# Patient Record
Sex: FEMALE | Race: WHITE | Employment: FULL TIME | URBAN - METROPOLITAN AREA
[De-identification: names, ages, dates, MRNs, and addresses within clinical notes are randomized per-mention and may not be internally consistent; named-entity substitution may affect disease eponyms.]

---

## 2018-03-12 ENCOUNTER — HOSPITAL ENCOUNTER (OUTPATIENT)
Dept: GENERAL RADIOLOGY | Age: 53
Discharge: HOME | End: 2018-03-12
Attending: ORTHOPAEDIC SURGERY
Payer: COMMERCIAL

## 2018-03-12 ENCOUNTER — HOSPITAL ENCOUNTER (OUTPATIENT)
Dept: MRI IMAGING | Age: 53
Discharge: HOME | End: 2018-03-12
Attending: ORTHOPAEDIC SURGERY
Payer: COMMERCIAL

## 2018-03-12 DIAGNOSIS — M17.12 OSTEOARTHRITIS OF LEFT KNEE: ICD-10-CM

## 2018-03-12 PROCEDURE — 73600 X-RAY EXAM OF ANKLE: CPT

## 2018-03-12 PROCEDURE — 73721 MRI JNT OF LWR EXTRE W/O DYE: CPT

## 2018-03-12 PROCEDURE — 73560 X-RAY EXAM OF KNEE 1 OR 2: CPT

## 2018-03-12 PROCEDURE — 73501 X-RAY EXAM HIP UNI 1 VIEW: CPT

## 2018-04-26 ENCOUNTER — HOSPITAL ENCOUNTER (OUTPATIENT)
Dept: PREADMISSION TESTING | Age: 53
Discharge: HOME | End: 2018-04-26
Payer: COMMERCIAL

## 2018-04-26 VITALS — HEIGHT: 70 IN | WEIGHT: 180 LBS | BODY MASS INDEX: 25.77 KG/M2

## 2018-04-26 LAB
ALBUMIN SERPL-MCNC: 4 G/DL (ref 3.4–5)
ALBUMIN/GLOB SERPL: 1.3 {RATIO} (ref 0.8–1.7)
ALP SERPL-CCNC: 51 U/L (ref 45–117)
ALT SERPL-CCNC: 19 U/L (ref 13–56)
ANION GAP SERPL CALC-SCNC: 12 MMOL/L (ref 3–18)
APPEARANCE UR: CLEAR
AST SERPL-CCNC: 17 U/L (ref 15–37)
ATRIAL RATE: 69 BPM
BACTERIA SPEC CULT: NORMAL
BILIRUB SERPL-MCNC: 0.5 MG/DL (ref 0.2–1)
BILIRUB UR QL: NEGATIVE
BUN SERPL-MCNC: 16 MG/DL (ref 7–18)
BUN/CREAT SERPL: 22 (ref 12–20)
CALCIUM SERPL-MCNC: 9.1 MG/DL (ref 8.5–10.1)
CALCULATED P AXIS, ECG09: 26 DEGREES
CALCULATED R AXIS, ECG10: 36 DEGREES
CALCULATED T AXIS, ECG11: 41 DEGREES
CHLORIDE SERPL-SCNC: 102 MMOL/L (ref 100–108)
CO2 SERPL-SCNC: 25 MMOL/L (ref 21–32)
COLOR UR: YELLOW
CREAT SERPL-MCNC: 0.73 MG/DL (ref 0.6–1.3)
DIAGNOSIS, 93000: NORMAL
ERYTHROCYTE [DISTWIDTH] IN BLOOD BY AUTOMATED COUNT: 15.3 % (ref 11.6–14.5)
GLOBULIN SER CALC-MCNC: 3.1 G/DL (ref 2–4)
GLUCOSE SERPL-MCNC: 74 MG/DL (ref 74–99)
GLUCOSE UR STRIP.AUTO-MCNC: NEGATIVE MG/DL
HCT VFR BLD AUTO: 37.7 % (ref 35–45)
HGB BLD-MCNC: 11.7 G/DL (ref 12–16)
HGB UR QL STRIP: NEGATIVE
KETONES UR QL STRIP.AUTO: NEGATIVE MG/DL
LEUKOCYTE ESTERASE UR QL STRIP.AUTO: NEGATIVE
MCH RBC QN AUTO: 27.3 PG (ref 24–34)
MCHC RBC AUTO-ENTMCNC: 31 G/DL (ref 31–37)
MCV RBC AUTO: 88.1 FL (ref 74–97)
NITRITE UR QL STRIP.AUTO: NEGATIVE
P-R INTERVAL, ECG05: 160 MS
PH UR STRIP: 7.5 [PH] (ref 5–8)
PLATELET # BLD AUTO: 318 K/UL (ref 135–420)
PMV BLD AUTO: 10.3 FL (ref 9.2–11.8)
POTASSIUM SERPL-SCNC: 4.5 MMOL/L (ref 3.5–5.5)
PROT SERPL-MCNC: 7.1 G/DL (ref 6.4–8.2)
PROT UR STRIP-MCNC: NEGATIVE MG/DL
Q-T INTERVAL, ECG07: 394 MS
QRS DURATION, ECG06: 104 MS
QTC CALCULATION (BEZET), ECG08: 422 MS
RBC # BLD AUTO: 4.28 M/UL (ref 4.2–5.3)
SERVICE CMNT-IMP: NORMAL
SODIUM SERPL-SCNC: 139 MMOL/L (ref 136–145)
SP GR UR REFRACTOMETRY: 1.01 (ref 1–1.03)
UROBILINOGEN UR QL STRIP.AUTO: 0.2 EU/DL (ref 0.2–1)
VENTRICULAR RATE, ECG03: 69 BPM
WBC # BLD AUTO: 4.1 K/UL (ref 4.6–13.2)

## 2018-04-26 PROCEDURE — 81003 URINALYSIS AUTO W/O SCOPE: CPT | Performed by: ORTHOPAEDIC SURGERY

## 2018-04-26 PROCEDURE — 87641 MR-STAPH DNA AMP PROBE: CPT | Performed by: ORTHOPAEDIC SURGERY

## 2018-04-26 PROCEDURE — 93005 ELECTROCARDIOGRAM TRACING: CPT

## 2018-04-26 PROCEDURE — 87086 URINE CULTURE/COLONY COUNT: CPT | Performed by: ORTHOPAEDIC SURGERY

## 2018-04-26 PROCEDURE — 85027 COMPLETE CBC AUTOMATED: CPT | Performed by: ORTHOPAEDIC SURGERY

## 2018-04-26 PROCEDURE — 80053 COMPREHEN METABOLIC PANEL: CPT | Performed by: ORTHOPAEDIC SURGERY

## 2018-04-26 RX ORDER — IBUPROFEN 200 MG
200 TABLET ORAL
COMMUNITY
Start: 2011-10-10 | End: 2018-05-16

## 2018-04-26 RX ORDER — METRONIDAZOLE 10 MG/G
GEL TOPICAL
COMMUNITY

## 2018-04-26 RX ORDER — CLINDAMYCIN HYDROCHLORIDE 300 MG/1
300 CAPSULE ORAL
COMMUNITY
End: 2018-05-16

## 2018-04-26 RX ORDER — CEFAZOLIN SODIUM/WATER 2 G/20 ML
2 SYRINGE (ML) INTRAVENOUS ONCE
Status: CANCELLED | OUTPATIENT
Start: 2018-04-26 | End: 2018-04-26

## 2018-04-26 RX ORDER — LEVOTHYROXINE SODIUM 50 UG/1
1 TABLET ORAL
COMMUNITY
Start: 2011-10-10

## 2018-04-26 RX ORDER — SODIUM CHLORIDE, SODIUM LACTATE, POTASSIUM CHLORIDE, CALCIUM CHLORIDE 600; 310; 30; 20 MG/100ML; MG/100ML; MG/100ML; MG/100ML
125 INJECTION, SOLUTION INTRAVENOUS CONTINUOUS
Status: CANCELLED | OUTPATIENT
Start: 2018-04-26

## 2018-04-26 NOTE — PERIOP NOTES
Chg wipes givenPatient states that the family physician is not aware of upcoming procedureDenies sleep apneaDenies family history of anesthesia complicationsDenies shortness of breath nor chest pain while climbing stairs

## 2018-04-28 LAB
BACTERIA SPEC CULT: NORMAL
SERVICE CMNT-IMP: NORMAL

## 2018-05-11 PROBLEM — M17.12 PRIMARY OSTEOARTHRITIS OF LEFT KNEE: Chronic | Status: ACTIVE | Noted: 2018-05-11

## 2018-05-11 PROBLEM — E03.9 ACQUIRED HYPOTHYROIDISM: Chronic | Status: ACTIVE | Noted: 2018-05-11

## 2018-05-11 PROBLEM — D64.9 ANEMIA: Chronic | Status: ACTIVE | Noted: 2018-05-11

## 2018-05-11 NOTE — H&P
History and Physical        Patient: Lupis Ng               Sex: female          DOA: (Not on file)         YOB: 1965      Age:  48 y.o.        LOS:  LOS: 0 days        HPI:     Dez Rosenberg is here for evaluation and treatment regarding her left knee severe lateral tibiofemoral and patellofemoral DJD with also medial joint line symptomatology. She has been through Euflexxa injections which did not help, cortisone injections, and she has tried Motrin. 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 It is really affecting her activities of daily living. She used to walk three miles a day and subsequently had to stop that. She has some question of giving way and crepitation laterally. She has episodes where she has significant decreased range of motion, even passively. She does feel instability. She is ready to consider total knee arthroplasty at this point. She is status post a right TKA done by Dr. Roberto Paul previously, and she has had great results with that. She has tried glucosamine chondroitin sulfate and this has not been markedly helpful either. Standing AP, tunnel, lateral, and sunrise views of the knee were obtained and interpreted in the office and reveal severe lateral tibiofemoral DJD and patellofemoral joint DJD with tilting as well. Otherwise, no periosteal reaction, no medullary lesions, no osteopenia, and no chondrolysis. Past Medical History:   Diagnosis Date    Arthritis     Rosacea     Thyroid disease     hypo       Past Surgical History:   Procedure Laterality Date    HX KNEE ARTHROSCOPY      left    HX KNEE REPLACEMENT      right       No family history on file.     Social History     Social History    Marital status: SINGLE     Spouse name: N/A    Number of children: N/A    Years of education: N/A     Social History Main Topics    Smoking status: Never Smoker    Smokeless tobacco: Never Used    Alcohol use Yes      Comment: 10 glasses per week    Drug use: No    Sexual activity: Not on file     Other Topics Concern    Not on file     Social History Narrative    No narrative on file       Prior to Admission medications    Medication Sig Start Date End Date Taking? Authorizing Provider   levothyroxine (SYNTHROID) 50 mcg tablet Take 1 Tab by mouth. 10/10/11   Historical Provider   metroNIDAZOLE (METROGEL) 1 % topical gel Apply  to affected area. Indications: ACNE ROSACEA    Historical Provider   ibuprofen (MOTRIN IB) 200 mg tablet 200 mg. 10/10/11   Historical Provider   clindamycin (CLEOCIN) 300 mg capsule Take 300 mg by mouth. Historical Provider       Allergies not on file    Review of Systems    Pertinent positives include joint pain and joint stiffness. Pertinent negatives include chills, fever, weight change and loss of balance. Physical Exam:      There were no vitals taken for this visit. Physical Exam:  On physical examination of the patient's left knee, she does have significant patellofemoral crepitation. She is in a valgus stance. She has tenderness to palpation over the lateral tibiofemoral joint and some tenderness to palpation of the medial tibiofemoral joint. Otherwise, examination of the left knee demonstrates the patient has full extension to flexion well beyond 90 degrees. The patient has a negative Lachman and has no varus or valgus instability at zero degrees or 30 degrees. There is a negative posterior sag. There is no knee effusion. There is no swelling, ecchymosis, or wounds. The patient has no popliteal pain. The patient has a negative patellar inhibition, a negative patellar grind, and a negative patellar apprehension test.  There is a negative Santy Maneuver. There is no pain at the inferior pole of the patella or the tibial tubercle. The patient has 5/5 muscle strength with good quadriceps tone.   The patient has good capillary refill with normal motor strength of the foot and ankle and normal light-touch sensation in the foot and lower leg. Assessment/Plan     Principal Problem:    Primary osteoarthritis of left knee (5/11/2018)    Active Problems:    Acquired hypothyroidism (5/11/2018)      Anemia (5/11/2018)      The patient was seen and examined by Dr. Shonna Silvestre and plan discussed with Dr. Shonna Silvestre. She is going to be scheduled for a left total knee arthroplasty. The risks associated with this procedure including blood clots, anesthetic reaction, infection, neurovascular compromise, myotendinous injury, need for future surgery, DVT, PE, bleeding, amongst others, were reviewed and all questions were answered. We will use aspirin 81 mg b.i.d. for DVT prevention. She tolerated Norco the best after her last surgery, so we have issued a prescription for Norco for postoperative pain and Keflex, as he plans to do her as an outpatient procedure. She does report that she has a documented history of a penicillin allergy, but she has not had it in years and is not quite sure what the reaction was. She has had Keflex in the past and has not had a problem. I have asked her to follow up with Dr. Shonna Silvestre two weeks after the surgery.

## 2018-05-15 ENCOUNTER — ANESTHESIA EVENT (OUTPATIENT)
Dept: SURGERY | Age: 53
End: 2018-05-15
Payer: COMMERCIAL

## 2018-05-15 RX ORDER — PREGABALIN 25 MG/1
25 CAPSULE ORAL ONCE
Status: CANCELLED | OUTPATIENT
Start: 2018-05-15 | End: 2018-05-15

## 2018-05-15 RX ORDER — SODIUM CHLORIDE 0.9 % (FLUSH) 0.9 %
5-10 SYRINGE (ML) INJECTION EVERY 8 HOURS
Status: CANCELLED | OUTPATIENT
Start: 2018-05-15

## 2018-05-15 RX ORDER — ACETAMINOPHEN 10 MG/ML
1000 INJECTION, SOLUTION INTRAVENOUS ONCE
Status: CANCELLED | OUTPATIENT
Start: 2018-05-15 | End: 2018-05-15

## 2018-05-15 RX ORDER — SODIUM CHLORIDE 0.9 % (FLUSH) 0.9 %
5-10 SYRINGE (ML) INJECTION AS NEEDED
Status: CANCELLED | OUTPATIENT
Start: 2018-05-15

## 2018-05-15 RX ORDER — DIPHENHYDRAMINE HCL 25 MG
25 CAPSULE ORAL
Status: CANCELLED | OUTPATIENT
Start: 2018-05-15

## 2018-05-16 ENCOUNTER — HOSPITAL ENCOUNTER (OUTPATIENT)
Age: 53
Setting detail: OUTPATIENT SURGERY
Discharge: HOME HEALTH | End: 2018-05-16
Attending: ORTHOPAEDIC SURGERY | Admitting: ORTHOPAEDIC SURGERY
Payer: COMMERCIAL

## 2018-05-16 ENCOUNTER — ANESTHESIA (OUTPATIENT)
Dept: SURGERY | Age: 53
End: 2018-05-16
Payer: COMMERCIAL

## 2018-05-16 VITALS
DIASTOLIC BLOOD PRESSURE: 74 MMHG | TEMPERATURE: 97.8 F | WEIGHT: 174.13 LBS | HEART RATE: 68 BPM | OXYGEN SATURATION: 98 % | SYSTOLIC BLOOD PRESSURE: 132 MMHG | HEIGHT: 70 IN | BODY MASS INDEX: 24.93 KG/M2 | RESPIRATION RATE: 15 BRPM

## 2018-05-16 DIAGNOSIS — M17.12 PRIMARY OSTEOARTHRITIS OF LEFT KNEE: Primary | Chronic | ICD-10-CM

## 2018-05-16 LAB
ABO + RH BLD: NORMAL
BLOOD GROUP ANTIBODIES SERPL: NORMAL
HCG SERPL QL: NEGATIVE
SPECIMEN EXP DATE BLD: NORMAL

## 2018-05-16 PROCEDURE — 74011000258 HC RX REV CODE- 258: Performed by: ORTHOPAEDIC SURGERY

## 2018-05-16 PROCEDURE — C1713 ANCHOR/SCREW BN/BN,TIS/BN: HCPCS | Performed by: ORTHOPAEDIC SURGERY

## 2018-05-16 PROCEDURE — 77030020269 HC MISC IMPL: Performed by: ORTHOPAEDIC SURGERY

## 2018-05-16 PROCEDURE — 77030018836 HC SOL IRR NACL ICUM -A: Performed by: ORTHOPAEDIC SURGERY

## 2018-05-16 PROCEDURE — 77030031139 HC SUT VCRL2 J&J -A: Performed by: ORTHOPAEDIC SURGERY

## 2018-05-16 PROCEDURE — 77030013708 HC HNDPC SUC IRR PULS STRY –B: Performed by: ORTHOPAEDIC SURGERY

## 2018-05-16 PROCEDURE — 74011250637 HC RX REV CODE- 250/637: Performed by: PHYSICIAN ASSISTANT

## 2018-05-16 PROCEDURE — 76060000036 HC ANESTHESIA 2.5 TO 3 HR: Performed by: ORTHOPAEDIC SURGERY

## 2018-05-16 PROCEDURE — 77030002934 HC SUT MCRYL J&J -B: Performed by: ORTHOPAEDIC SURGERY

## 2018-05-16 PROCEDURE — 74011000250 HC RX REV CODE- 250: Performed by: ORTHOPAEDIC SURGERY

## 2018-05-16 PROCEDURE — 74011250637 HC RX REV CODE- 250/637: Performed by: ANESTHESIOLOGY

## 2018-05-16 PROCEDURE — 77030038011: Performed by: ORTHOPAEDIC SURGERY

## 2018-05-16 PROCEDURE — 77030037875 HC DRSG MEPILEX <16IN BORD MOLN -A: Performed by: ORTHOPAEDIC SURGERY

## 2018-05-16 PROCEDURE — 74011250636 HC RX REV CODE- 250/636: Performed by: ORTHOPAEDIC SURGERY

## 2018-05-16 PROCEDURE — 77030020782 HC GWN BAIR PAWS FLX 3M -B: Performed by: ORTHOPAEDIC SURGERY

## 2018-05-16 PROCEDURE — 36415 COLL VENOUS BLD VENIPUNCTURE: CPT | Performed by: ORTHOPAEDIC SURGERY

## 2018-05-16 PROCEDURE — 77030012508 HC MSK AIRWY LMA AMBU -A: Performed by: ANESTHESIOLOGY

## 2018-05-16 PROCEDURE — 74011250636 HC RX REV CODE- 250/636

## 2018-05-16 PROCEDURE — 74011250637 HC RX REV CODE- 250/637: Performed by: ORTHOPAEDIC SURGERY

## 2018-05-16 PROCEDURE — 97161 PT EVAL LOW COMPLEX 20 MIN: CPT

## 2018-05-16 PROCEDURE — 64447 NJX AA&/STRD FEMORAL NRV IMG: CPT | Performed by: ANESTHESIOLOGY

## 2018-05-16 PROCEDURE — L1830 KO IMMOB CANVAS LONG PRE OTS: HCPCS | Performed by: ORTHOPAEDIC SURGERY

## 2018-05-16 PROCEDURE — 77030037400 HC ADH TISS HI VISC EXOFIN CHMP -B: Performed by: ORTHOPAEDIC SURGERY

## 2018-05-16 PROCEDURE — 77030018822 HC SLV COMPR FT COVD -B: Performed by: ORTHOPAEDIC SURGERY

## 2018-05-16 PROCEDURE — 74011250636 HC RX REV CODE- 250/636: Performed by: PHYSICIAN ASSISTANT

## 2018-05-16 PROCEDURE — 86901 BLOOD TYPING SEROLOGIC RH(D): CPT | Performed by: ORTHOPAEDIC SURGERY

## 2018-05-16 PROCEDURE — 76210000026 HC REC RM PH II 1 TO 1.5 HR: Performed by: ORTHOPAEDIC SURGERY

## 2018-05-16 PROCEDURE — 77030020268 HC MISC GENERAL SUPPLY: Performed by: ORTHOPAEDIC SURGERY

## 2018-05-16 PROCEDURE — 77030011640 HC PAD GRND REM COVD -A: Performed by: ORTHOPAEDIC SURGERY

## 2018-05-16 PROCEDURE — 74011250636 HC RX REV CODE- 250/636: Performed by: ANESTHESIOLOGY

## 2018-05-16 PROCEDURE — 74011000250 HC RX REV CODE- 250

## 2018-05-16 PROCEDURE — 77030027138 HC INCENT SPIROMETER -A: Performed by: ORTHOPAEDIC SURGERY

## 2018-05-16 PROCEDURE — 76942 ECHO GUIDE FOR BIOPSY: CPT | Performed by: ORTHOPAEDIC SURGERY

## 2018-05-16 PROCEDURE — 76010000132 HC OR TIME 2.5 TO 3 HR: Performed by: ORTHOPAEDIC SURGERY

## 2018-05-16 PROCEDURE — 84703 CHORIONIC GONADOTROPIN ASSAY: CPT | Performed by: ORTHOPAEDIC SURGERY

## 2018-05-16 PROCEDURE — 77030003666 HC NDL SPINAL BD -A: Performed by: ORTHOPAEDIC SURGERY

## 2018-05-16 PROCEDURE — 77030034694 HC SCPL CANADY PLSM DISP USMD -E: Performed by: ORTHOPAEDIC SURGERY

## 2018-05-16 PROCEDURE — 77030016060 HC NDL NRV BLK TELE -A: Performed by: ANESTHESIOLOGY

## 2018-05-16 PROCEDURE — 76210000006 HC OR PH I REC 0.5 TO 1 HR: Performed by: ORTHOPAEDIC SURGERY

## 2018-05-16 PROCEDURE — 77030038010: Performed by: ORTHOPAEDIC SURGERY

## 2018-05-16 PROCEDURE — 77030036563 HC WRP CLD THER KNE S2SG -B: Performed by: ORTHOPAEDIC SURGERY

## 2018-05-16 DEVICE — CEMENT BNE 40GM FULL DOSE PMMA W/O ANTIBIO HI VISC N RADPQ: Type: IMPLANTABLE DEVICE | Site: KNEE | Status: FUNCTIONAL

## 2018-05-16 RX ORDER — TRANEXAMIC ACID 650 1/1
1950 TABLET ORAL ONCE
Status: COMPLETED | OUTPATIENT
Start: 2018-05-16 | End: 2018-05-16

## 2018-05-16 RX ORDER — MIDAZOLAM HYDROCHLORIDE 1 MG/ML
INJECTION, SOLUTION INTRAMUSCULAR; INTRAVENOUS AS NEEDED
Status: DISCONTINUED | OUTPATIENT
Start: 2018-05-16 | End: 2018-05-16 | Stop reason: HOSPADM

## 2018-05-16 RX ORDER — GUAIFENESIN 100 MG/5ML
81 LIQUID (ML) ORAL 2 TIMES DAILY
Qty: 42 TAB | Refills: 0 | Status: SHIPPED | OUTPATIENT
Start: 2018-05-16

## 2018-05-16 RX ORDER — ACETAMINOPHEN 500 MG
1000 TABLET ORAL ONCE
Status: COMPLETED | OUTPATIENT
Start: 2018-05-16 | End: 2018-05-16

## 2018-05-16 RX ORDER — FENTANYL CITRATE 50 UG/ML
50 INJECTION, SOLUTION INTRAMUSCULAR; INTRAVENOUS
Status: DISCONTINUED | OUTPATIENT
Start: 2018-05-16 | End: 2018-05-16 | Stop reason: HOSPADM

## 2018-05-16 RX ORDER — LIDOCAINE HYDROCHLORIDE 20 MG/ML
INJECTION, SOLUTION EPIDURAL; INFILTRATION; INTRACAUDAL; PERINEURAL AS NEEDED
Status: DISCONTINUED | OUTPATIENT
Start: 2018-05-16 | End: 2018-05-16 | Stop reason: HOSPADM

## 2018-05-16 RX ORDER — HYDROCODONE BITARTRATE AND ACETAMINOPHEN 5; 325 MG/1; MG/1
1-2 TABLET ORAL
Qty: 60 TAB | Refills: 0 | Status: SHIPPED | OUTPATIENT
Start: 2018-05-16

## 2018-05-16 RX ORDER — CELECOXIB 100 MG/1
400 CAPSULE ORAL
Status: COMPLETED | OUTPATIENT
Start: 2018-05-16 | End: 2018-05-16

## 2018-05-16 RX ORDER — FENTANYL CITRATE 50 UG/ML
25 INJECTION, SOLUTION INTRAMUSCULAR; INTRAVENOUS
Status: DISCONTINUED | OUTPATIENT
Start: 2018-05-16 | End: 2018-05-16 | Stop reason: HOSPADM

## 2018-05-16 RX ORDER — PROPOFOL 10 MG/ML
INJECTION, EMULSION INTRAVENOUS AS NEEDED
Status: DISCONTINUED | OUTPATIENT
Start: 2018-05-16 | End: 2018-05-16 | Stop reason: HOSPADM

## 2018-05-16 RX ORDER — OXYCODONE HYDROCHLORIDE 5 MG/1
5 TABLET ORAL
Status: COMPLETED | OUTPATIENT
Start: 2018-05-16 | End: 2018-05-16

## 2018-05-16 RX ORDER — HYDROMORPHONE HYDROCHLORIDE 2 MG/ML
INJECTION, SOLUTION INTRAMUSCULAR; INTRAVENOUS; SUBCUTANEOUS AS NEEDED
Status: DISCONTINUED | OUTPATIENT
Start: 2018-05-16 | End: 2018-05-16 | Stop reason: HOSPADM

## 2018-05-16 RX ORDER — KETAMINE HYDROCHLORIDE 10 MG/ML
INJECTION, SOLUTION INTRAMUSCULAR; INTRAVENOUS AS NEEDED
Status: DISCONTINUED | OUTPATIENT
Start: 2018-05-16 | End: 2018-05-16 | Stop reason: HOSPADM

## 2018-05-16 RX ORDER — GLYCOPYRROLATE 0.2 MG/ML
INJECTION INTRAMUSCULAR; INTRAVENOUS AS NEEDED
Status: DISCONTINUED | OUTPATIENT
Start: 2018-05-16 | End: 2018-05-16 | Stop reason: HOSPADM

## 2018-05-16 RX ORDER — SODIUM CHLORIDE, SODIUM LACTATE, POTASSIUM CHLORIDE, CALCIUM CHLORIDE 600; 310; 30; 20 MG/100ML; MG/100ML; MG/100ML; MG/100ML
125 INJECTION, SOLUTION INTRAVENOUS CONTINUOUS
Status: DISCONTINUED | OUTPATIENT
Start: 2018-05-16 | End: 2018-05-16 | Stop reason: HOSPADM

## 2018-05-16 RX ORDER — SODIUM CHLORIDE 0.9 % (FLUSH) 0.9 %
5-10 SYRINGE (ML) INJECTION AS NEEDED
Status: DISCONTINUED | OUTPATIENT
Start: 2018-05-16 | End: 2018-05-16 | Stop reason: HOSPADM

## 2018-05-16 RX ORDER — ONDANSETRON 2 MG/ML
INJECTION INTRAMUSCULAR; INTRAVENOUS AS NEEDED
Status: DISCONTINUED | OUTPATIENT
Start: 2018-05-16 | End: 2018-05-16 | Stop reason: HOSPADM

## 2018-05-16 RX ORDER — CEFAZOLIN SODIUM/WATER 2 G/20 ML
2 SYRINGE (ML) INTRAVENOUS ONCE
Status: COMPLETED | OUTPATIENT
Start: 2018-05-16 | End: 2018-05-16

## 2018-05-16 RX ORDER — CEPHALEXIN 500 MG/1
500 CAPSULE ORAL 4 TIMES DAILY
Qty: 4 CAP | Refills: 0 | Status: SHIPPED | OUTPATIENT
Start: 2018-05-16 | End: 2018-05-17

## 2018-05-16 RX ORDER — DEXAMETHASONE SODIUM PHOSPHATE 4 MG/ML
INJECTION, SOLUTION INTRA-ARTICULAR; INTRALESIONAL; INTRAMUSCULAR; INTRAVENOUS; SOFT TISSUE AS NEEDED
Status: DISCONTINUED | OUTPATIENT
Start: 2018-05-16 | End: 2018-05-16 | Stop reason: HOSPADM

## 2018-05-16 RX ORDER — DEXMEDETOMIDINE HYDROCHLORIDE 4 UG/ML
INJECTION, SOLUTION INTRAVENOUS AS NEEDED
Status: DISCONTINUED | OUTPATIENT
Start: 2018-05-16 | End: 2018-05-16 | Stop reason: HOSPADM

## 2018-05-16 RX ORDER — SODIUM CHLORIDE, SODIUM LACTATE, POTASSIUM CHLORIDE, CALCIUM CHLORIDE 600; 310; 30; 20 MG/100ML; MG/100ML; MG/100ML; MG/100ML
100 INJECTION, SOLUTION INTRAVENOUS CONTINUOUS
Status: DISCONTINUED | OUTPATIENT
Start: 2018-05-16 | End: 2018-05-16 | Stop reason: HOSPADM

## 2018-05-16 RX ADMIN — KETAMINE HYDROCHLORIDE 20 MG: 10 INJECTION, SOLUTION INTRAMUSCULAR; INTRAVENOUS at 14:48

## 2018-05-16 RX ADMIN — DEXMEDETOMIDINE HYDROCHLORIDE 8 MCG: 4 INJECTION, SOLUTION INTRAVENOUS at 13:16

## 2018-05-16 RX ADMIN — FENTANYL CITRATE 50 MCG: 50 INJECTION, SOLUTION INTRAMUSCULAR; INTRAVENOUS at 16:01

## 2018-05-16 RX ADMIN — SODIUM CHLORIDE, SODIUM LACTATE, POTASSIUM CHLORIDE, AND CALCIUM CHLORIDE 1000 ML: 600; 310; 30; 20 INJECTION, SOLUTION INTRAVENOUS at 11:04

## 2018-05-16 RX ADMIN — PROPOFOL 200 MG: 10 INJECTION, EMULSION INTRAVENOUS at 13:05

## 2018-05-16 RX ADMIN — MIDAZOLAM HYDROCHLORIDE 2 MG: 1 INJECTION, SOLUTION INTRAMUSCULAR; INTRAVENOUS at 13:01

## 2018-05-16 RX ADMIN — ACETAMINOPHEN 1000 MG: 500 TABLET, FILM COATED ORAL at 12:17

## 2018-05-16 RX ADMIN — FENTANYL CITRATE 50 MCG: 50 INJECTION, SOLUTION INTRAMUSCULAR; INTRAVENOUS at 15:47

## 2018-05-16 RX ADMIN — DEXMEDETOMIDINE HYDROCHLORIDE 8 MCG: 4 INJECTION, SOLUTION INTRAVENOUS at 13:23

## 2018-05-16 RX ADMIN — GLYCOPYRROLATE 0.2 MG: 0.2 INJECTION INTRAMUSCULAR; INTRAVENOUS at 13:01

## 2018-05-16 RX ADMIN — TRANEXAMIC ACID 1950 MG: 650 TABLET ORAL at 10:55

## 2018-05-16 RX ADMIN — LIDOCAINE HYDROCHLORIDE 60 MG: 20 INJECTION, SOLUTION EPIDURAL; INFILTRATION; INTRACAUDAL; PERINEURAL at 13:05

## 2018-05-16 RX ADMIN — ONDANSETRON 4 MG: 2 INJECTION INTRAMUSCULAR; INTRAVENOUS at 13:11

## 2018-05-16 RX ADMIN — OXYCODONE HYDROCHLORIDE 5 MG: 5 TABLET ORAL at 16:46

## 2018-05-16 RX ADMIN — SODIUM CHLORIDE, SODIUM LACTATE, POTASSIUM CHLORIDE, AND CALCIUM CHLORIDE: 600; 310; 30; 20 INJECTION, SOLUTION INTRAVENOUS at 15:14

## 2018-05-16 RX ADMIN — DEXAMETHASONE SODIUM PHOSPHATE 8 MG: 4 INJECTION, SOLUTION INTRA-ARTICULAR; INTRALESIONAL; INTRAMUSCULAR; INTRAVENOUS; SOFT TISSUE at 13:11

## 2018-05-16 RX ADMIN — CELECOXIB 400 MG: 100 CAPSULE ORAL at 12:17

## 2018-05-16 RX ADMIN — HYDROMORPHONE HYDROCHLORIDE 1 MG: 2 INJECTION, SOLUTION INTRAMUSCULAR; INTRAVENOUS; SUBCUTANEOUS at 13:50

## 2018-05-16 RX ADMIN — FENTANYL CITRATE 50 MCG: 50 INJECTION, SOLUTION INTRAMUSCULAR; INTRAVENOUS at 15:52

## 2018-05-16 RX ADMIN — SODIUM CHLORIDE, SODIUM LACTATE, POTASSIUM CHLORIDE, AND CALCIUM CHLORIDE 125 ML/HR: 600; 310; 30; 20 INJECTION, SOLUTION INTRAVENOUS at 11:04

## 2018-05-16 RX ADMIN — KETAMINE HYDROCHLORIDE 30 MG: 10 INJECTION, SOLUTION INTRAMUSCULAR; INTRAVENOUS at 13:12

## 2018-05-16 RX ADMIN — SODIUM CHLORIDE, SODIUM LACTATE, POTASSIUM CHLORIDE, AND CALCIUM CHLORIDE: 600; 310; 30; 20 INJECTION, SOLUTION INTRAVENOUS at 13:32

## 2018-05-16 RX ADMIN — DEXMEDETOMIDINE HYDROCHLORIDE 8 MCG: 4 INJECTION, SOLUTION INTRAVENOUS at 13:50

## 2018-05-16 RX ADMIN — Medication 2 G: at 13:11

## 2018-05-16 NOTE — ANESTHESIA POSTPROCEDURE EVALUATION
Post-Anesthesia Evaluation and Assessment    Cardiovascular Function/Vital Signs  Visit Vitals    /78    Pulse 85    Temp 37.3 °C (99.1 °F)    Resp 16    Ht 5' 10\" (1.778 m)    Wt 79 kg (174 lb 2 oz)    SpO2 100%    BMI 24.98 kg/m2       Patient is status post Procedure(s):  LEFT TOTAL KNEE ARTHROPLASTY  **SPEC POP**PATIENT REQUESTS DR. LEE FOR ANESTHESIA**. Nausea/Vomiting: Controlled. Postoperative hydration reviewed and adequate. Pain:  Pain Scale 1: Numeric (0 - 10) (05/16/18 1605)  Pain Intensity 1: 3 (05/16/18 1605)   Managed. Neurological Status:   Neuro (WDL): Within Defined Limits (05/16/18 1108)   At baseline. Mental Status and Level of Consciousness: Baseline and stable. Pulmonary Status:   O2 Device: Room air (05/16/18 1556)   Adequate oxygenation and airway patent. Complications related to anesthesia: None    Post-anesthesia assessment completed. No concerns. Patient has met all discharge requirements.     Signed By: Hattie Bermudez MD

## 2018-05-16 NOTE — ANESTHESIA PROCEDURE NOTES
Peripheral Block    Start time: 5/16/2018 12:26 PM  End time: 5/16/2018 12:32 PM  Performed by: Edilma Carlos  Authorized by: Edilma Carlos       Pre-procedure: Indications: at surgeon's request and post-op pain management    Preanesthetic Checklist: patient identified, risks and benefits discussed, site marked, timeout performed, anesthesia consent given and patient being monitored    Timeout Time: 12:26          Block Type:   Block Type:   Adductor canal  Monitoring:  Standard ASA monitoring, continuous pulse ox, frequent vital sign checks, heart rate, responsive to questions and oxygen  Injection Technique:  Single shot  Procedures: ultrasound guided    Patient Position: supine  Prep: chlorhexidine    Needle Type:  Stimuplex  Needle Gauge:  21 G  Needle Localization:  Anatomical landmarks, infiltration and ultrasound guidance  Medication Injected:  0.5%  ropivacaine  Volume (mL):  25    Assessment:  Number of attempts:  1  Injection Assessment:  Incremental injection every 5 mL, local visualized surrounding nerve on ultrasound, negative aspiration for blood, no paresthesia, no intravascular symptoms and ultrasound image on chart  Patient tolerance:  Patient tolerated the procedure well with no immediate complications

## 2018-05-16 NOTE — PROGRESS NOTES
Problem: Mobility Impaired (Adult and Pediatric)  Goal: *Acute Goals and Plan of Care (Insert Text)  Goals to be addressed in 1-4 days:  STG  1. Rolling L to R to L modified independent for positioning. 2. Supine to sit to supine S with HR for meals. 3. Sit to stand to sit S with RW in prep for ambulation. LTG  1. Ambulate >150ft S with RW, WBAT, for home/community mobility. 2. Ascend/descend a >3 stair steps CGA with HR for home entry. 3. Tolerate up in chair 1-2 hours for ADLs. 4. Patient/family to be independent with HEP for follow-up care and safe discharge. Outcome: Resolved/Met Date Met: 05/16/18  physical Therapy EVALUATION & Discharge    Patient: Zoila Mcburney (54 y.o. female)  Date: 5/16/2018  Primary Diagnosis: LEFT KNEE OSTEOARTHRITIS  Left knee DJD  Procedure(s) (LRB):  LEFT TOTAL KNEE ARTHROPLASTY  **SPEC POP**PATIENT REQUESTS DR. LEE FOR ANESTHESIA** (Left) Day of Surgery   Precautions:   Fall, WBAT    ASSESSMENT AND RECOMMENDATIONS:  Based on the objective data described below, the patient presents with lower extremity weakness, decreased gait quality and endurance, decreased R knee ROM/flexibility, and overall limitations in functional mobility s/p R TKR. Pt performed supine to sit with Erna, sit to stand with supervision. Patient ambulated 150 feet with RW, GB applied, supervision, negotiated 3 stairs with B handrails c/Supervision. Pt tolerated session well as evidenced by no c/o increased pain, no lightheadedness or dizziness. Pt has been educated on seated and supine therex to be performed 3x/day at home to improve strength and ROM. Pt has met inpatient physical therapy goals at this time and home health physical therapy is recommended upon discharge from hospital.    Discharge Recommendations: Home Health  Further Equipment Recommendations for Discharge: N/A      SUBJECTIVE:   Patient stated I am doing ok.     OBJECTIVE DATA SUMMARY:     Past Medical History:   Diagnosis Date    Arthritis     Rosacea     Thyroid disease     hypo     Past Surgical History:   Procedure Laterality Date    HX KNEE ARTHROSCOPY      left    HX KNEE REPLACEMENT      right     Barriers to Learning/Limitations: None  Compensate with: visual, verbal, tactile, kinesthetic cues/model  Prior Level of Function/Home Situation: Independent amb s/AD  Home Situation  Home Environment: Private residence  # Steps to Enter: 3  Rails to Enter: Yes  Hand Rails : Bilateral  One/Two Story Residence: One story  Living Alone: No  Support Systems: Spouse/Significant Other/Partner, Child(manju)  Patient Expects to be Discharged to[de-identified] Private residence  Current DME Used/Available at Home: Walker, rolling  Critical Behavior:  Neurologic State: Alert  Skin Condition/Temp: Warm;Dry  Skin Integrity: Incision (comment)  Skin Integumentary  Skin Color: Appropriate for ethnicity  Skin Condition/Temp: Warm;Dry  Skin Integrity: Incision (comment)  Strength:    Strength: Generally decreased, functional  Tone & Sensation:   Tone: Normal  Sensation: Intact  Range Of Motion:  AROM: Generally decreased, functional  PROM: Generally decreased, functional  Functional Mobility:  Bed Mobility:  Supine to Sit: Modified independent  Sit to Supine: Modified independent  Transfers:  Sit to Stand: Supervision  Stand to Sit: Supervision  Balance:   Sitting: Intact  Standing: Intact; With support  Ambulation/Gait Training:  Distance (ft): 150 Feet (ft)  Assistive Device: Gait belt;Walker, rolling  Ambulation - Level of Assistance: Supervision  Gait Description (WDL): Exceptions to WDL  Gait Abnormalities: Decreased step clearance; Antalgic; Step to gait  Left Side Weight Bearing: As tolerated  Base of Support: Shift to right  Stance: Left decreased  Speed/Sunitha: Slow  Step Length: Right shortened;Left shortened  Swing Pattern: Left asymmetrical;Right asymmetrical  Pain:  Pain Scale 1: Numeric (0 - 10)  Pain Intensity 1: 5  Pain Location 1: Knee  Pain Orientation 1: Left  Pain Description 1: Aching  Pain Intervention(s) 1: Medication (see MAR)  Activity Tolerance:   Good  Please refer to the flowsheet for vital signs taken during this treatment. After treatment:   []         Patient left in no apparent distress sitting up in chair  [x]         Patient left in no apparent distress in bed  [x]         Call bell left within reach  [x]         Nursing notified  []         Caregiver present  []         Bed alarm activated    COMMUNICATION/EDUCATION:   [x]         Fall prevention education was provided and the patient/caregiver indicated understanding. [x]         Patient/family have participated as able in goal setting and plan of care to d/c home.  []         Patient/family agree to work toward stated goals and plan of care. []         Patient understands intent and goals of therapy, but is neutral about his/her participation. []         Patient is unable to participate in goal setting and plan of care. Thank you for this referral.  Tara Odell   Time Calculation: 20 mins  Eval Complexity: History: MEDIUM  Complexity : 1-2 comorbidities / personal factors will impact the outcome/ POC Exam:LOW Complexity : 1-2 Standardized tests and measures addressing body structure, function, activity limitation and / or participation in recreation  Presentation: LOW Complexity : Stable, uncomplicated  Clinical Decision Making:Low Complexity amb >30 ft c/RW, supervision for safety with mobility Overall Complexity:LOW  Mobility  Current  CI= 1-19%   Goal  CI= 1-19%  D/C  CI= 1-19%. The severity rating is based on the Level of Assistance required for Functional Mobility and ADLs.

## 2018-05-16 NOTE — ANESTHESIA PREPROCEDURE EVALUATION
Anesthetic History        Pertinent negatives: No PONV       Review of Systems / Medical History  Patient summary reviewed, nursing notes reviewed and pertinent labs reviewed    Pulmonary              Pertinent negatives: No asthma, sleep apnea and smoker     Neuro/Psych           Pertinent negatives: No seizures and CVA   Cardiovascular                Pertinent negatives: No hypertension, past MI and angina  Exercise tolerance: >4 METS     GI/Hepatic/Renal             Pertinent negatives: No GERD, liver disease and renal disease   Endo/Other      Hypothyroidism: well controlled  Arthritis     Other Findings              Physical Exam    Airway  Mallampati: I  TM Distance: 4 - 6 cm  Neck ROM: normal range of motion   Mouth opening: Normal     Cardiovascular    Rhythm: regular  Rate: normal         Dental  No notable dental hx    Comments: Bonded retainer bottom   Pulmonary  Breath sounds clear to auscultation               Abdominal  GI exam deferred       Other Findings            Anesthetic Plan    ASA: 2  Anesthesia type: general      Post-op pain plan if not by surgeon: peripheral nerve block single    Induction: Intravenous  Anesthetic plan and risks discussed with: Patient      Plan GA/LMA with single shot adductor canal nerve block to aid in post-op pain control. Pt understands plan and risks and agrees to proceed.

## 2018-05-16 NOTE — DISCHARGE INSTRUCTIONS
Astrid Cano III, MD Rayleen Gambles, PA-C    Lower Extremity Surgery  Discharge Instructions      Please take the time to review the following instructions before you leave the hospital and use them as guidelines during your recovery from surgery. If you have any questions you may contact my office at [phone]. Wound Care/Dressing Changes:    [x]   You may change your dressing as needed. Beginning the 2 days after you are discharged from the hospital you can change your dressing daily. A big, bulky dressing isn't necessary as long as there isn't any drainage from the incisions. You will be shown how to change the dressings properly by the home health aids as well. There will be a piece of tape over your incision that resembles gauze. This tape should stay on and you should not attempt to remove it. Once you begin to get this wet in the shower this will begin to fall off on its own, although it will take days. Do not apply antibiotic ointment to your incisions. Showering/Bathing:    [x]   You may shower 2 days after surgery. Your dressing may be removed for showering. You may get your incisions wet in the shower. Don't vigorously scrub the area where your incisions are. Please pat dry the incision. Apply a clean, dry dressing after drying off the area of your incisions. Don't take a tub bath, get in a swimming pool or Jacuzzi until the incisions are completely healed, and you are seen in the office by Dr. Roberto Paul. Do not soak your incisions under water. []   Do not get the dressing wet. Once you remove it two days from surgery, you may get the incision wet if there is no drainage. Weight Bearing Status/Braces/Activity:    [x]   If you have had a total knee replacement you may weight bear as tolerated with the knee immobilizer in place. Use crutches, cane, or walker as needed. You should sleep in your knee immobilizer.   You need to begin working on range of motion early after your surgery. It is very important to work on extension (straighthening) the knee. You will be advanced with walking and range of motion by physical therapy at home.     []   If you have had a total hip replacement you may weight bear as tolerated. Physical therapy with come by your house to help you perform exercises and work on strength and range of motion. Ice/Elevation:    Continue ice and elevation consistently for 48 hours after surgery. If you have had a total knee replacement when elevating your knee elevate it on about 4 pillows to be sure it is above your heart. After 48 hours, you should ice your knee 3 times per day, for 20 minutes at a time for the next 5 days. After one week from surgery, you may use ice and elevation as needed for pain and swelling. Diet:    You may advance to your regular diet as tolerated. Medication:    1. You will be given a prescription for pain medications when you are discharged from the hospital.  Take the medication as needed according to the directions on the prescription bottle. Possible side effects of the medication include dizziness, headache, nausea, vomiting, constipation and urinary retention. If you experience any of these side effects call the office so that we can assist you in relieving them. Discontinue the use of the pain medication if you develop itching, rash, shortness of breath or difficulties swallowing. If these symptoms become severe or aren't relieved by discontinuing the medication you should seek immediate medical attention. Refills of pain medication are authorized during office hours only (8AM - 5PM Mon thru Fri). 2. If you were prescribed Percocet/oxycodone or Dilaudid/hydromorphone you must have a written prescription. These medications legally cannot be called in to a pharmacy. 3. Do not take Tylenol in addition to your pain medication as most of the pain medication already contains Tylenol.   Exceptions include Dilaudid/hydromorphone, Demerol/meperidine and roxicodone. Do not exceed 3000 mg of Tylenol per day. Ex:  (hydrocodone 5/325mg = 325 mg of Tylenol)  4. You should use Aspirin 81 mg twice daily for 21 days from the date of your surgery. This will help to prevent blood clots from forming in your legs. This needs to be started the day after your surgery and home healthcare will teach you how this is done. If you are taking another medication such as Xarelto as discussed with Dr. Ana Urbina this should also be started the day after the surgery. 5. You may resume the medications you were taking prior to your surgery, unless otherwise specified in your discharge instructions. Pain medication may change the effects of any antidepressant medication. If you have any questions about possible interactions between your regular medications and the pain medication you should consult the physician who prescribes your regular medications. 6. If you had a total joint as an outpatient procedure and did not spend the night in the hospital, Dr. Ana Urbina has written for an oral antibiotic that you should take as instructed. This antibiotic is generally Keflex or Clindamycin. If you spent the night in the hospital the antibiotic was given to you through the IV and there is nothing else to take orally. Follow Up Appointment:    If you are unsure of your follow-up appointment date and time, please call [phone]. Please let our  know you are scheduling a post-op appointment. Most appointments should be between 7-14 days after your surgery. Physical Therapy:    [x]   Physical therapy will be discussed with you at your first follow-up appointment with Dr. Ana Urbina. You don't need to begin physical therapy prior to that visit. You are to participate with [address] [place] [address] as arranged pre-operatively in the convience of your own home.     Important signs and symptoms:    If any of the following signs and symptoms occurs, you should contact Dr. Elliot Winston' office. Please be advised if a problem arises which you feel required immediate medical attention or your are unable to contact Dr. Elliot Winston' office you should seek immediate medical attention. Signs and symptoms to watch for include:  1. A sudden increase in swelling and/or redness or warmth at the area your surgery was performed which isn't relieved by rest, ice and elevation. 2. Oral temperature greater than 101.5 degrees for 12 hours or more which isn't relieved by an increase in fluid intake and taking two Tylenol every 4-6 hours. Do not exceed 3000 mg of Tylenol per day. 3. Excessive drainage from your incisions or drainage that hasn't stopped by 72 hours. 4. Calf pian, tenderness, redness or swelling which isn't relieved with rest and elevation. 5. Fever, chills, shortness of breath, chest pain, nausea, vomiting or other signs and symptoms which are of concern to you. DISCHARGE SUMMARY from Nurse    PATIENT INSTRUCTIONS:    After general anesthesia or intravenous sedation, for 24 hours or while taking prescription Narcotics:  · Limit your activities  · Do not drive and operate hazardous machinery  · Do not make important personal or business decisions  · Do  not drink alcoholic beverages  · If you have not urinated within 8 hours after discharge, please contact your surgeon on call. Report the following to your surgeon:  · Excessive pain, swelling, redness or odor of or around the surgical area  · Temperature over 100.5  · Nausea and vomiting lasting longer than 4 hours or if unable to take medications  · Any signs of decreased circulation or nerve impairment to extremity: change in color, persistent  numbness, tingling, coldness or increase pain  · Any questions    What to do at Home:  Recommended activity: Ambulate in house,     If you experience any of the following symptoms above, please follow up with Dr. Elliot Winston.     *  Please give a list of your current medications to your Primary Care Provider. *  Please update this list whenever your medications are discontinued, doses are      changed, or new medications (including over-the-counter products) are added. *  Please carry medication information at all times in case of emergency situations. These are general instructions for a healthy lifestyle:    No smoking/ No tobacco products/ Avoid exposure to second hand smoke  Surgeon General's Warning:  Quitting smoking now greatly reduces serious risk to your health. Obesity, smoking, and sedentary lifestyle greatly increases your risk for illness    A healthy diet, regular physical exercise & weight monitoring are important for maintaining a healthy lifestyle    You may be retaining fluid if you have a history of heart failure or if you experience any of the following symptoms:  Weight gain of 3 pounds or more overnight or 5 pounds in a week, increased swelling in our hands or feet or shortness of breath while lying flat in bed. Please call your doctor as soon as you notice any of these symptoms; do not wait until your next office visit. Recognize signs and symptoms of STROKE:    F-face looks uneven    A-arms unable to move or move unevenly    S-speech slurred or non-existent    T-time-call 911 as soon as signs and symptoms begin-DO NOT go       Back to bed or wait to see if you get better-TIME IS BRAIN. Warning Signs of HEART ATTACK     Call 911 if you have these symptoms:   Chest discomfort. Most heart attacks involve discomfort in the center of the chest that lasts more than a few minutes, or that goes away and comes back. It can feel like uncomfortable pressure, squeezing, fullness, or pain.  Discomfort in other areas of the upper body. Symptoms can include pain or discomfort in one or both arms, the back, neck, jaw, or stomach.  Shortness of breath with or without chest discomfort.    Other signs may include breaking out in a cold sweat, nausea, or lightheadedness. Don't wait more than five minutes to call 911 - MINUTES MATTER! Fast action can save your life. Calling 911 is almost always the fastest way to get lifesaving treatment. Emergency Medical Services staff can begin treatment when they arrive -- up to an hour sooner than if someone gets to the hospital by car. Patient armband removed and shredded     The discharge information has been reviewed with the patient and caregiver. The patient and caregiver verbalized understanding. Discharge medications reviewed with the patient and caregiver and appropriate educational materials and side effects teaching were provided.   ___________________________________________________________________________________________________________________________________

## 2018-05-16 NOTE — INTERVAL H&P NOTE
H&P Update:  Jade Mccain was seen and examined. History and physical has been reviewed. The patient has been examined. There have been no significant clinical changes since the completion of the originally dated History and Physical.  Patient identified by surgeon; surgical site was confirmed by patient and surgeon.     Signed By: Keny Greco MD     May 16, 2018 11:59 AM

## 2018-05-16 NOTE — PERIOP NOTES
Pt. Used restroom in pre-op area with assistance. Patient placed on Kiran Paws for a minimum of 30 min in  Preop.

## 2018-05-16 NOTE — IP AVS SNAPSHOT
[address] [address] 
[phone] Patient: Mumtaz Lal MRN: [id number] :1965 About your hospitalization You were admitted on:  May 16, 2018 You last received care in the:  [facility] PREOP/PHII You were discharged on:  May 16, 2018 Why you were hospitalized Your primary diagnosis was:  Primary Osteoarthritis Of Left Knee Your diagnoses also included:  Acquired Hypothyroidism, Anemia, Left Knee Djd Follow-up Information Follow up With Details Comments Contact [place]   [address] [address] [facility] 
[phone] Danny Vu MD  Schedule appointment in 7-14 days [address] [address] 
[phone] Discharge Orders None A check jun indicates which time of day the medication should be taken. My Medications START taking these medications Instructions Each Dose to Equal  
 Morning Noon Evening Bedtime  
 aspirin 81 mg chewable tablet Your last dose was: Your next dose is: Take 1 Tab by mouth two (2) times a day. 81 mg  
    
   
   
   
  
 cephALEXin 500 mg capsule Commonly known as:  Ralene Plum Your last dose was: Your next dose is: Take 1 Cap by mouth four (4) times daily for 1 day. 500 mg HYDROcodone-acetaminophen 5-325 mg per tablet Commonly known as:  Kori Batman Your last dose was: Your next dose is: Take 1-2 Tabs by mouth every four (4) hours as needed for Pain. Max Daily Amount: 12 Tabs. 1-2 Tab CONTINUE taking these medications Instructions Each Dose to Equal  
 Morning Noon Evening Bedtime  
 levothyroxine 50 mcg tablet Commonly known as:  SYNTHROID Your last dose was: Your next dose is: Take 1 Tab by mouth. 1 Tab metroNIDAZOLE 1 % topical gel Commonly known as:  [employer] Your last dose was: Your next dose is:    
   
   
 Apply  to affected area. Indications: ACNE ROSACEA  
     
   
   
   
  
  
STOP taking these medications   
 clindamycin 300 mg capsule Commonly known as:  CLEOCIN  
   
  
 MOTRIN  mg tablet Generic drug:  ibuprofen Where to Get Your Medications Information on where to get these meds will be given to you by the nurse or doctor. ! Ask your nurse or doctor about these medications  
  aspirin 81 mg chewable tablet  
 cephALEXin 500 mg capsule HYDROcodone-acetaminophen 5-325 mg per tablet Opioid Education Prescription Opioids: What You Need to Know: 
 
 
[x]   You may change your dressing as needed. Beginning the 2 days after you are discharged from the hospital you can change your dressing daily. A big, bulky dressing isn't necessary as long as there isn't any drainage from the incisions. You will be shown how to change the dressings properly by the home health aids as well. There will be a piece of tape over your incision that resembles gauze. This tape should stay on and you should not attempt to remove it. Once you begin to get this wet in the shower this will begin to fall off on its own, although it will take days. Do not apply antibiotic ointment to your incisions. Showering/Bathing: 
 
[x]   You may shower 2 days after surgery. Your dressing may be removed for showering. You may get your incisions wet in the shower. Don't vigorously scrub the area where your incisions are. Please pat dry the incision. Apply a clean, dry dressing after drying off the area of your incisions. Don't take a tub bath, get in a swimming pool or Jacuzzi until the incisions are completely healed, and you are seen in the office by Dr. Dang Mclaughlin. Do not soak your incisions under water. []   Do not get the dressing wet. Once you remove it two days from surgery, you may get the incision wet if there is no drainage. Weight Bearing Status/Braces/Activity: 
 
[x]   If you have had a total knee replacement you may weight bear as tolerated with the knee immobilizer in place. Use crutches, cane, or walker as needed. You should sleep in your knee immobilizer. You need to begin working on range of motion early after your surgery. It is very important to work on extension (straighthening) the knee. You will be advanced with walking and range of motion by physical therapy at home.  
 
[]   If you have had a total hip replacement you may weight bear as tolerated. Physical therapy with come by your house to help you perform exercises and work on strength and range of motion. Ice/Elevation: 
 
Continue ice and elevation consistently for 48 hours after surgery. If you have had a total knee replacement when elevating your knee elevate it on about 4 pillows to be sure it is above your heart. After 48 hours, you should ice your knee 3 times per day, for 20 minutes at a time for the next 5 days. After one week from surgery, you may use ice and elevation as needed for pain and swelling. Diet: 
 
You may advance to your regular diet as tolerated. Medication: 1. You will be given a prescription for pain medications when you are discharged from the hospital.  Take the medication as needed according to the directions on the prescription bottle. Possible side effects of the medication include dizziness, headache, nausea, vomiting, constipation and urinary retention. If you experience any of these side effects call the office so that we can assist you in relieving them. Discontinue the use of the pain medication if you develop itching, rash, shortness of breath or difficulties swallowing. If these symptoms become severe or aren't relieved by discontinuing the medication you should seek immediate medical attention. Refills of pain medication are authorized during office hours only (8AM - 5PM Mon thru Fri). 2. If you were prescribed Percocet/oxycodone or Dilaudid/hydromorphone you must have a written prescription. These medications legally cannot be called in to a pharmacy. 3. Do not take Tylenol in addition to your pain medication as most of the pain medication already contains Tylenol. Exceptions include Dilaudid/hydromorphone, Demerol/meperidine and roxicodone. Do not exceed 3000 mg of Tylenol per day. Ex:  (hydrocodone 5/325mg = 325 mg of Tylenol) 4. You should use Aspirin 81 mg twice daily for 21 days from the date of your surgery. This will help to prevent blood clots from forming in your legs. This needs to be started the day after your surgery and home healthcare will teach you how this is done. If you are taking another medication such as Xarelto as discussed with Dr. Rona Bermudez this should also be started the day after the surgery. 5. You may resume the medications you were taking prior to your surgery, unless otherwise specified in your discharge instructions. Pain medication may change the effects of any antidepressant medication.   If you have any questions about possible interactions between your regular medications and the pain medication you should consult the physician who prescribes your regular medications. 6. If you had a total joint as an outpatient procedure and did not spend the night in the hospital, Dr. Kate Cochran has written for an oral antibiotic that you should take as instructed. This antibiotic is generally Keflex or Clindamycin. If you spent the night in the hospital the antibiotic was given to you through the IV and there is nothing else to take orally. Follow Up Appointment: If you are unsure of your follow-up appointment date and time, please call [phone]. Please let our  know you are scheduling a post-op appointment. Most appointments should be between 7-14 days after your surgery. Physical Therapy: 
 
[x]   Physical therapy will be discussed with you at your first follow-up appointment with Dr. Kate Cochran. You don't need to begin physical therapy prior to that visit. You are to participate with [address] [place] [address] as arranged pre-operatively in the convience of your own home. Important signs and symptoms: 
 
If any of the following signs and symptoms occurs, you should contact Dr. Kate Cochran' office. Please be advised if a problem arises which you feel required immediate medical attention or your are unable to contact Dr. Kate Cochran' office you should seek immediate medical attention. Signs and symptoms to watch for include: 1. A sudden increase in swelling and/or redness or warmth at the area your surgery was performed which isn't relieved by rest, ice and elevation. 2. Oral temperature greater than 101.5 degrees for 12 hours or more which isn't relieved by an increase in fluid intake and taking two Tylenol every 4-6 hours. Do not exceed 3000 mg of Tylenol per day. 3. Excessive drainage from your incisions or drainage that hasn't stopped by 72 hours. 4. Calf pian, tenderness, redness or swelling which isn't relieved with rest and elevation. 5. Fever, chills, shortness of breath, chest pain, nausea, vomiting or other signs and symptoms which are of concern to you. DISCHARGE SUMMARY from Nurse PATIENT INSTRUCTIONS: 
 
 
F-face looks uneven A-arms unable to move or move unevenly S-speech slurred or non-existent T-time-call 911 as soon as signs and symptoms begin-DO NOT go Back to bed or wait to see if you get better-TIME IS BRAIN. Warning Signs of HEART ATTACK Call 911 if you have these symptoms: 
? Chest discomfort. Most heart attacks involve discomfort in the center of the chest that lasts more than a few minutes, or that goes away and comes back. It can feel like uncomfortable pressure, squeezing, fullness, or pain. ? Discomfort in other areas of the upper body. Symptoms can include pain or discomfort in one or both arms, the back, neck, jaw, or stomach. ? Shortness of breath with or without chest discomfort. ? Other signs may include breaking out in a cold sweat, nausea, or lightheadedness. Don't wait more than five minutes to call [address]! Fast action can save your life. Calling 911 is almost always the fastest way to get lifesaving treatment. Emergency Medical Services staff can begin treatment when they arrive  up to an hour sooner than if someone gets to the hospital by car. Patient armband removed and shredded The discharge information has been reviewed with the patient and caregiver. The patient and caregiver verbalized understanding. Discharge medications reviewed with the patient and caregiver and appropriate educational materials and side effects teaching were provided. ___________________________________________________________________________________________________________________________________ Introducing [facility]! Nate Wagner introduces neoSaejt patient portal. Now you can access parts of your medical record, email your doctor's office, and request medication refills online. 1. In your internet browser, go to [email or web address] 2. Click on the First Time User? Click Here link in the Sign In box. You will see the New Member Sign Up page. 3. Enter your ParAccel Access Code exactly as it appears below. You will not need to use this code after youve completed the sign-up process. If you do not sign up before the expiration date, you must request a new code. · Par[employer] Access Code: [email or web address] Expires: 5/30/2018  2:19 PM 
 
4. Enter the last four digits of your Social Security Number (xxxx) and Date of Birth (mm/dd/yyyy) as indicated and click Submit. You will be taken to the next sign-up page. 5. Create a ParAccel ID. This will be your Par[employer]el login ID and cannot be changed, so think of one that is secure and easy to remember. 6. Create a Par[employer] password. You can change your password at any time. 7. Enter your Password Reset Question and Answer. This can be used at a later time if you forget your password. 8. Enter your e-mail address. You will receive e-mail notification when new information is available in [address] 9. Click Sign Up. You can now view and download portions of your medical record. 10. Click the Download Summary menu link to download a portable copy of your medical information. If you have questions, please visit the Frequently Asked Questions section of the Par[employer] website. Remember, ParAccel is NOT to be used for urgent needs. For medical emergencies, dial 911. Now available from your iPhone and Android! Introducing Timothy Jasso As a Nate Wagner patient, I wanted to make you aware of our electronic visit tool called Timothy Jasso. Nate Wagner 24/7 allows you to connect within minutes with a medical provider 24 hours a day, seven days a week via a mobile device or tablet or logging into a secure website from your computer. You can access [email or web address] from anywhere in the [place]. A virtual visit might be right for you when you have a simple condition and feel like you just dont want to get out of bed, or cant get away from work for an appointment, when your regular [facility] provider is not available (evenings, weekends or holidays), or when youre out of town and need minor care. Electronic visits cost only $49 and if the SandovalLocal[employer] 24/IMANIN provider determines a prescription is needed to treat your condition, one can be electronically transmitted to a nearby pharmacy*. Please take a moment to enroll today if you have not already done so. The enrollment process is free and takes just a few minutes. To enroll, please download the MakeSpace aliza to your tablet or phone, or visit [email or web address] to enroll on your computer. And, as an [address] [place] [address] patient with a [employer] account, the results of your visits will be scanned into your electronic medical record and your primary care provider will be able to view the scanned results. We urge you to continue to see your regular [facility] provider for your ongoing medical care. And while your primary care provider may not be the one available when you seek a Timothy Jasso virtual visit, the peace of mind you get from getting a real diagnosis real time can be priceless. For more information on Timothy Michaelnicholefin, view our Frequently Asked Questions (FAQs) at [email or web address]. Sincerely, 
 
Lexus Gonzalez MD 
Chief Medical Officer Shonda Fink *:  certain medications cannot be prescribed via Timothy Celironargis Providers Seen During Your Hospitalization Provider Specialty Primary office phone Anastacio Robles, [facility] [phone] Your Primary Care Physician (PCP) Primary Care Physician Office Phone Office Fax Catalina Reyes [phone] You are allergic to the following Allergen Reactions Biaxin (Clarithromycin) Rash Pcn (Penicillins) Other (comments)  
 unknown Recent Documentation Height Weight BMI OB Status Smoking Status 1.778 m 79 kg 24.98 kg/m2 Having regular periods Never Smoker Emergency Contacts Name Discharge Info Relation Home Work Mobile [address] CAREGIVER [3] Daughter [21]   [phone] Patient Belongings The following personal items are in your possession at time of discharge: 
  Dental Appliances: None  Visual Aid: Glasses      Home Medications: None   Jewelry: None  Clothing: Undergarments, Socks, Footwear, Shirt, Pants (with Mikayla)    Other Valuables: Cell Phone, Eyeglasses, Wallet, Purse (with Mikayla) Please provide this summary of care documentation to your next provider. Signatures-by signing, you are acknowledging that this After Visit Summary has been reviewed with you and you have received a copy. Patient Signature:  ____________________________________________________________ Date:  ____________________________________________________________  
  
[facility] Provider Signature:  ____________________________________________________________ Date:  ____________________________________________________________

## 2018-05-17 NOTE — OP NOTES
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 PROCEDURE PERFORMED:  Left cemented total knee arthroplasty using the Smith and Nephew Journey II XR with Visionaire blocks. (A size 6 femur, a size 4 tibia, 8 mm tibial medial and lateral tibial polyethylene, 32 mm oblong patella). SURGEON:  Tila Gustafson MD     FIRST ASSISTANT:  Casper Villatoro PA-C    ANESTHESIA:  General.    COMPLICATIONS:  None. TOURNIQUET TIME:  None. ESTIMATED BLOOD LOSS:  50cc    SPECIMENS REMOVED:  none    IMPLANTS:  As above    INDICATIONS:  This is a 47-year-old white female with known left severe lateral tibiofemoral DJD that is unresponsive to all conservative intervention. Patient now presents for left bicruciate retaining total knee arthroplasty. OPERATION:  The patient was brought to the operating theater and after adequate anesthesia, the left knee was prepped and draped in the typical sterile fashion. An anterior midline incision was then made from just above the patella to the tibial tubercle. The incision was taken down and medial and lateral flaps were developed using the argon wand. A mid vastus approach to the knee was then performed after instilling about 40 mL of 0.25% Marcaine with epinephrine, Toradol and 10 mg of morphine sulfate. This was injected in the skin and in the capsule and around the VMO. The dissection was carried on the proximal medial tibia to about the mid lateral line and I undermined the patella to its insertion to get better visualization. The patella was then slid laterally and the knee flexed greater than 90 degrees.   I used the Visionaire block on the femur first, that was the patient matched guide. It was then pinned with 4 pins, 2 distally and 2 proximally. This had excellent fit. The distal resection was then made and the size 6, five and 1 cutting block was then placed in position and the drill pins were used to hold it locked. I made all 5 distal femoral cuts at this time, making sure to keep the ACL out of harm's way. The guide was then removed and then I placed the tibial patient matched Visionaire guide on the proximal tibia and it actually lined up really well with the proposed cuts. I then placed the 2 pins for the datum block. I checked the alignment with the drop maria guadalupe and it was right to the first webspace. Another pin was placed in line with the medial aspect of the sagittal cut to be placed because it had good rotational alignment. The guide was then removed and the 3 degree datum block was placed over the 2 pins that were left in place. The goalpost guide was then placed in position on either side of the ACL and the medial and lateral sagittal resections were then performed protecting my cut with the 2 drill pins that were already placed from anterior to posterior being sure they did not penetrate beyond the bone posteriorly. The medial tibial plateau resection was then performed at this level and then this medial piece of bone was removed. The remaining menisci was removed at this time and the femur was placed on the distal femur and this was trialled with an 8 mm poly. I needed to take 2 additional millimeters off the tibia at this time in order to get some laxity of the medial joint because it was a little too tight. At this point, attention was turned to the lateral joint. I placed the lateral cutting block into the hole towards the lateral  sagittal section that was performed. The patella was kept out of harm's way and a resection was performed with a sagittal saw from proximal to distal.  This piece of the femur was then removed.   We placed the trial for the medial and lateral guides with the femur in position. At this point, it showed good stability, excellent range of motion and normal 1-2 mm of laxity of the medial and lateral joints. She had 0-135 degrees nicely. The anterior chamfer on the lip of the femur was then performed and the 2 lug holes were drilled. The datum block was then placed back on the drill pins and the guide for the box chisel for the ACL resection anteriorly was placed flush anteriorly and then this was used to transect to the appropriate depth and then the saw was used through the medial portion of the datum block to resect the small piece anteriorly. The old guide system was then removed and a size 4 left guide was placed on top of the tibia which actually had excellent purchase and good coverage. With this in position, it had 2 drill pins to hold it in place. I then put the guide through the top of the 4 left tibial guide, which allowed 4 large drill pins to be placed and the 3-4 punch to finish the tibia. These were all removed and the small curette on the field was used to remove any remaining bone in the keel. Once this was all done, the posterior part of   the knee was irrigated out. It was cauterized with the argon and about 20 mL of the Marcaine mixture was placed in the posterior part of the knee about 10 mL on both sides. The menisci had already been removed. The DePuy HV cement was mixed on the back table and the components were placed on the table. The patella was cut from a 22 mm starting block to 15 mm after we finished cutting it flush parallel to the natural articular margin. The 32 mm oblong patella guide was then placed on the cut surface of the patella with good fit and the 3 peg holes were drilled. This had already been Simpulse lavaged. The tibia was cemented first and the cement was placed into the keel and also onto the backside of the tibial component.   This was then impacted into the femur with good purchase. All excessive cement was removed with pickups and a knife. The polyethylenes were then snapped into position medially and laterally. The lateral one would not fit snugly initially, so a second poly was used which fit well. The femur was then inserted in position and seated fully. All excessive cement was removed with pickups and a 15 blade. The knee was then extended fully and anterior femoral pressure applied. The knee was flexed again and any excess cement was removed again at this time. The patella was addressed last where the cement was placed on the patella. It was put in position and held in by the clamp until the cemented had compressed fully. All excess cement was removed with pickups and a 15 blade. The clamp was removed. Range of motion was assessed and was 0-135 with great stability and good motion. The wound was then irrigated out again and the arthrotomy was closed with a running #1 Stratafix. The skin was closed with inverted 2-0 Vicryls and then the skin was sealed with Dermabond Prineo skin system. Any additional Marcaine was placed intra-articular at this time. This patient then was dressed with cast padding and Ace wrap from the toes to mid thigh, placed in a knee immobilizer and returned to recovery room awake and in stable condition. All instrument, sponge and needle counts were correct. MD Cindy Ramirez / Elvira Greenberg  D: 05/16/2018 17:15     T: 05/16/2018 18:34  JOB #: [id number]

## (undated) DEVICE — Device

## (undated) DEVICE — (D)PREP SKN CHLRAPRP APPL 26ML -- CONVERT TO ITEM 371833

## (undated) DEVICE — SOLUTION IV 250ML 0.9% SOD CHL CLR INJ FLX BG CONT PRT CLSR

## (undated) DEVICE — SUTURE STRATAFIX SYMMETRIC PDS + 1 SGL ARMED CT 18 IN LEN SXPP1A405

## (undated) DEVICE — NDL PRT INJ NSAF BLNT 18GX1.5 --

## (undated) DEVICE — STERILE POLYISOPRENE POWDER-FREE SURGICAL GLOVES WITH EMOLLIENT COATING: Brand: PROTEXIS

## (undated) DEVICE — PACK PROCEDURE SURG TOT KNEE CUST

## (undated) DEVICE — DEVON™ KNEE AND BODY STRAP 60" X 3" (1.5 M X 7.6 CM): Brand: DEVON

## (undated) DEVICE — SUT VCRL + 2-0 36IN CT1 UD --

## (undated) DEVICE — SOL INJ SOD CL 0.9% 500ML BG --

## (undated) DEVICE — PIN GUIDE FIX 3.2X62 MM SCREW [GS903A0620322P] [KOMET MEDICAL]

## (undated) DEVICE — SPLINT KNEE UNIV FOR LESS THAN 36IN L20IN FOAM LAM E CNTCT

## (undated) DEVICE — PIN DRL QUIK HI PERF FOR SIG SYS

## (undated) DEVICE — REM POLYHESIVE ADULT PATIENT RETURN ELECTRODE: Brand: VALLEYLAB

## (undated) DEVICE — 5065 IMPAD REGULAR PAIR: Brand: A-V IMPULSE

## (undated) DEVICE — STERILE POLYISOPRENE POWDER-FREE SURGICAL GLOVES: Brand: PROTEXIS

## (undated) DEVICE — GOWN,SIRUS,NONRNF,SETINSLV,XL,20/CS: Brand: MEDLINE

## (undated) DEVICE — THE CANADY HYBRID PLASMA SCALPEL IS AN ELECTROSURGICAL PLASMA SCALPEL THAT USES AN 85MM BENDABLE PADDLE BLADE TIP. THE ELECTROSURGICAL PLASMA SCALPEL IS USED TO SIMULTANEOUSLY CUT AND COAGULATE BIOLOGICAL TISSUE.: Brand: CANADY HYBRID PLASMA PADDLE BLADE

## (undated) DEVICE — STERILE TETRA-FLEX CF LF, 6IN X 11 YD: Brand: TETRA-FLEX™ CF

## (undated) DEVICE — NDL SPNE QNCKE 18GX3.5IN LF --

## (undated) DEVICE — SOL IRRIGATION INJ NACL 0.9% 500ML BTL

## (undated) DEVICE — INTENDED FOR TISSUE SEPARATION, AND OTHER PROCEDURES THAT REQUIRE A SHARP SURGICAL BLADE TO PUNCTURE OR CUT.: Brand: BARD-PARKER ® CARBON RIB-BACK BLADES

## (undated) DEVICE — ADHESIVE TISS CLOSURE 22X4 CM 4 CC HI VISC EXOFIN

## (undated) DEVICE — IMMOBILIZER KNEE UNIV L19IN FOR 12-24IN THGH FOAM T BAR

## (undated) DEVICE — SYRINGE MED 20ML STD CLR PLAS LUERLOCK TIP N CTRL DISP

## (undated) DEVICE — HANDPIECE SET WITH FAN SPRAY TIP: Brand: INTERPULSE

## (undated) DEVICE — DRSG FOAM MEPILX PST OP 4X12IN --

## (undated) DEVICE — BLADE SAW W13XL90MM 1.19MM PARA

## (undated) DEVICE — SINGLE PORT MANIFOLD: Brand: NEPTUNE 2

## (undated) DEVICE — TURNOVER KIT OR F/MIH 1450

## (undated) DEVICE — LIGHT HANDLE: Brand: DEVON

## (undated) DEVICE — NEEDLE HYPO 22GA L1.5IN BLK S STL HUB POLYPR SHLD REG BVL

## (undated) DEVICE — BLADE SAW 1.19X20X90 MM FOR LG BNE